# Patient Record
Sex: FEMALE | Race: WHITE | Employment: OTHER | URBAN - METROPOLITAN AREA
[De-identification: names, ages, dates, MRNs, and addresses within clinical notes are randomized per-mention and may not be internally consistent; named-entity substitution may affect disease eponyms.]

---

## 2018-03-02 NOTE — PATIENT DISCUSSION
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 - Plan : Monitor for changes. Advised patient to call our office with decreased vision or increased symptoms.

## 2018-03-02 NOTE — PATIENT DISCUSSION
(N69.470) Keratoconjunct sicca, not specified as Sjogren's, bilateral - Assesment : Examination revealed Dry Eye Syndrome OU. - Plan : Monitor for changes. Advised patient to call our office with decreased vision or increased symptoms Recommend the use of artificial tears 3-4 timed daily/PRN OU. Sample Thera tears  given.

## 2018-03-02 NOTE — PATIENT DISCUSSION
(H25.12) Age-related nuclear cataract, left eye - Assesment : Examination revealed cataract OS. Patient is currently asymptomatic and functioning well. - Plan : Monitor for changes. Advised patient to call our office with decreased vision or increased symptoms.   RTC 56 month follow up/ OCT mac

## 2018-03-02 NOTE — PATIENT DISCUSSION
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

## 2020-01-13 ENCOUNTER — PREPPED CHART (OUTPATIENT)
Dept: URBAN - METROPOLITAN AREA CLINIC 30 | Facility: CLINIC | Age: 85
End: 2020-01-13

## 2021-02-01 ASSESSMENT — KERATOMETRY
OD_AXISANGLE2_DEGREES: 77
OS_AXISANGLE2_DEGREES: 113
OS_K1POWER_DIOPTERS: 44.50
OS_K2POWER_DIOPTERS: 44.00
OS_AXISANGLE_DEGREES: 023
OD_K2POWER_DIOPTERS: 43.75
OD_K1POWER_DIOPTERS: 44.75
OD_AXISANGLE_DEGREES: 167

## 2021-02-04 ENCOUNTER — ESTABLISHED COMPREHENSIVE EXAM (OUTPATIENT)
Dept: URBAN - METROPOLITAN AREA CLINIC 30 | Facility: CLINIC | Age: 86
End: 2021-02-04

## 2021-02-04 DIAGNOSIS — H52.4: ICD-10-CM

## 2021-02-04 DIAGNOSIS — H43.813: ICD-10-CM

## 2021-02-04 DIAGNOSIS — H35.3131: ICD-10-CM

## 2021-02-04 PROCEDURE — 92250 FUNDUS PHOTOGRAPHY W/I&R: CPT

## 2021-02-04 PROCEDURE — 92015BEC REFRACTION BEC

## 2021-02-04 PROCEDURE — 92014 COMPRE OPH EXAM EST PT 1/>: CPT

## 2021-02-04 ASSESSMENT — KERATOMETRY
OD_AXISANGLE_DEGREES: 167
OS_AXISANGLE2_DEGREES: 86
OD_K1POWER_DIOPTERS: 45.00
OS_K2POWER_DIOPTERS: 44.00
OD_K1POWER_DIOPTERS: 44.75
OD_K2POWER_DIOPTERS: 43.75
OD_AXISANGLE2_DEGREES: 85
OD_AXISANGLE_DEGREES: 175
OS_K1POWER_DIOPTERS: 44.50
OS_K2POWER_DIOPTERS: 43.50
OS_AXISANGLE_DEGREES: 023
OS_AXISANGLE2_DEGREES: 113
OD_AXISANGLE2_DEGREES: 77
OS_K1POWER_DIOPTERS: 44.25
OD_K2POWER_DIOPTERS: 43.50
OS_AXISANGLE_DEGREES: 176

## 2021-02-04 ASSESSMENT — TONOMETRY
OS_IOP_MMHG: 13
OD_IOP_MMHG: 13

## 2021-09-08 ASSESSMENT — KERATOMETRY
OD_AXISANGLE2_DEGREES: 85
OD_K1POWER_DIOPTERS: 44.75
OD_AXISANGLE_DEGREES: 167
OS_AXISANGLE_DEGREES: 176
OS_K2POWER_DIOPTERS: 43.50
OS_AXISANGLE2_DEGREES: 113
OD_K1POWER_DIOPTERS: 45.00
OS_AXISANGLE2_DEGREES: 86
OS_K1POWER_DIOPTERS: 44.50
OD_AXISANGLE_DEGREES: 175
OS_K1POWER_DIOPTERS: 44.25
OD_K2POWER_DIOPTERS: 43.50
OD_K2POWER_DIOPTERS: 43.75
OS_AXISANGLE_DEGREES: 023
OS_K2POWER_DIOPTERS: 44.00
OD_AXISANGLE2_DEGREES: 77

## 2022-03-23 ENCOUNTER — PREPPED CHART (OUTPATIENT)
Dept: URBAN - METROPOLITAN AREA CLINIC 30 | Facility: CLINIC | Age: 87
End: 2022-03-23

## 2022-03-23 DIAGNOSIS — H35.3131: ICD-10-CM

## 2022-03-23 PROCEDURE — 0509T PATTERN ERG W/I&R: CPT

## 2022-03-23 PROCEDURE — 92134 CPTRZ OPH DX IMG PST SGM RTA: CPT

## 2022-03-23 PROCEDURE — 99214 OFFICE O/P EST MOD 30 MIN: CPT

## 2022-04-04 ASSESSMENT — KERATOMETRY
OD_K2POWER_DIOPTERS: 43.75
OD_K1POWER_DIOPTERS: 45.00
OS_K1POWER_DIOPTERS: 44.25
OD_AXISANGLE2_DEGREES: 77
OS_AXISANGLE_DEGREES: 023
OS_K2POWER_DIOPTERS: 43.50
OD_AXISANGLE_DEGREES: 167
OD_AXISANGLE_DEGREES: 175
OD_AXISANGLE2_DEGREES: 85
OD_K1POWER_DIOPTERS: 44.75
OS_AXISANGLE2_DEGREES: 86
OS_AXISANGLE_DEGREES: 176
OS_K1POWER_DIOPTERS: 44.50
OS_K2POWER_DIOPTERS: 44.00
OS_AXISANGLE2_DEGREES: 113
OD_K2POWER_DIOPTERS: 43.50

## 2022-04-12 ENCOUNTER — COMPREHENSIVE EXAM (OUTPATIENT)
Dept: URBAN - METROPOLITAN AREA CLINIC 30 | Facility: CLINIC | Age: 87
End: 2022-04-12

## 2022-04-12 DIAGNOSIS — H35.373: ICD-10-CM

## 2022-04-12 DIAGNOSIS — Z96.1: ICD-10-CM

## 2022-04-12 DIAGNOSIS — H43.813: ICD-10-CM

## 2022-04-12 DIAGNOSIS — H35.3131: ICD-10-CM

## 2022-04-12 PROCEDURE — 92134 CPTRZ OPH DX IMG PST SGM RTA: CPT

## 2022-04-12 PROCEDURE — 0509T PATTERN ERG W/I&R: CPT

## 2022-04-12 PROCEDURE — 92014 COMPRE OPH EXAM EST PT 1/>: CPT

## 2022-04-12 ASSESSMENT — VISUAL ACUITY
OD_SC: 20/60
OS_CC: 20/50
OD_PH: 20/30-1
OS_SC: 20/40-2
OS_PH: 20/30-2
OD_CC: 20/30+2
OU_SC: 20/50

## 2022-04-12 ASSESSMENT — TONOMETRY
OS_IOP_MMHG: 12
OD_IOP_MMHG: 15

## 2022-06-20 ENCOUNTER — APPOINTMENT (RX ONLY)
Dept: URBAN - METROPOLITAN AREA CLINIC 151 | Facility: CLINIC | Age: 87
Setting detail: DERMATOLOGY
End: 2022-06-20

## 2022-06-20 DIAGNOSIS — D18.0 HEMANGIOMA: ICD-10-CM

## 2022-06-20 DIAGNOSIS — L57.0 ACTINIC KERATOSIS: ICD-10-CM

## 2022-06-20 DIAGNOSIS — L81.4 OTHER MELANIN HYPERPIGMENTATION: ICD-10-CM

## 2022-06-20 DIAGNOSIS — L82.1 OTHER SEBORRHEIC KERATOSIS: ICD-10-CM

## 2022-06-20 DIAGNOSIS — Z71.89 OTHER SPECIFIED COUNSELING: ICD-10-CM

## 2022-06-20 PROBLEM — D18.01 HEMANGIOMA OF SKIN AND SUBCUTANEOUS TISSUE: Status: ACTIVE | Noted: 2022-06-20

## 2022-06-20 PROCEDURE — 17000 DESTRUCT PREMALG LESION: CPT

## 2022-06-20 PROCEDURE — ? LIQUID NITROGEN

## 2022-06-20 PROCEDURE — 99203 OFFICE O/P NEW LOW 30 MIN: CPT | Mod: 25

## 2022-06-20 PROCEDURE — ? FULL BODY SKIN EXAM

## 2022-06-20 PROCEDURE — ? COUNSELING

## 2022-06-20 ASSESSMENT — LOCATION SIMPLE DESCRIPTION DERM
LOCATION SIMPLE: LEFT CHEEK
LOCATION SIMPLE: LEFT LOWER BACK
LOCATION SIMPLE: CHEST
LOCATION SIMPLE: ABDOMEN
LOCATION SIMPLE: RIGHT FOREHEAD
LOCATION SIMPLE: RIGHT UPPER BACK
LOCATION SIMPLE: LEFT PRETIBIAL REGION
LOCATION SIMPLE: LEFT EAR

## 2022-06-20 ASSESSMENT — LOCATION ZONE DERM
LOCATION ZONE: LEG
LOCATION ZONE: TRUNK
LOCATION ZONE: EAR
LOCATION ZONE: FACE

## 2022-06-20 ASSESSMENT — LOCATION DETAILED DESCRIPTION DERM
LOCATION DETAILED: RIGHT SUPERIOR MEDIAL UPPER BACK
LOCATION DETAILED: LEFT DISTAL PRETIBIAL REGION
LOCATION DETAILED: LEFT CENTRAL MALAR CHEEK
LOCATION DETAILED: RIGHT MID-UPPER BACK
LOCATION DETAILED: LEFT RIB CAGE
LOCATION DETAILED: RIGHT SUPERIOR MEDIAL FOREHEAD
LOCATION DETAILED: LEFT SUPERIOR HELIX
LOCATION DETAILED: LEFT SUPERIOR LATERAL MIDBACK
LOCATION DETAILED: RIGHT MEDIAL SUPERIOR CHEST

## 2022-06-20 NOTE — PROCEDURE: LIQUID NITROGEN
Show Applicator Variable?: Yes
Consent: The patient's consent was obtained including but not limited to risks of crusting, scabbing, blistering, scarring, darker or lighter pigmentary change, recurrence, incomplete removal and infection.
Render Note In Bullet Format When Appropriate: No
Detail Level: Detailed
Number Of Freeze-Thaw Cycles: 2 freeze-thaw cycles
Duration Of Freeze Thaw-Cycle (Seconds): 5
Post-Care Instructions: I reviewed with the patient in detail post-care instructions. Patient is to wear sunprotection, and avoid picking at any of the treated lesions. Pt may apply Vaseline to crusted or scabbing areas.

## 2023-04-17 ASSESSMENT — KERATOMETRY
OS_AXISANGLE2_DEGREES: 113
OS_AXISANGLE_DEGREES: 023
OD_AXISANGLE_DEGREES: 167
OS_AXISANGLE2_DEGREES: 86
OD_K1POWER_DIOPTERS: 45.00
OD_K2POWER_DIOPTERS: 43.75
OS_AXISANGLE_DEGREES: 176
OD_AXISANGLE2_DEGREES: 85
OS_K1POWER_DIOPTERS: 44.25
OD_AXISANGLE_DEGREES: 175
OS_K1POWER_DIOPTERS: 44.50
OD_K1POWER_DIOPTERS: 44.75
OS_K2POWER_DIOPTERS: 44.00
OD_K2POWER_DIOPTERS: 43.50
OS_K2POWER_DIOPTERS: 43.50
OD_AXISANGLE2_DEGREES: 77

## 2023-04-18 ENCOUNTER — COMPREHENSIVE EXAM (OUTPATIENT)
Facility: LOCATION | Age: 88
End: 2023-04-18

## 2023-04-18 DIAGNOSIS — H35.09: ICD-10-CM

## 2023-04-18 DIAGNOSIS — H43.813: ICD-10-CM

## 2023-04-18 DIAGNOSIS — H35.373: ICD-10-CM

## 2023-04-18 DIAGNOSIS — H35.3131: ICD-10-CM

## 2023-04-18 DIAGNOSIS — Z96.1: ICD-10-CM

## 2023-04-18 PROCEDURE — 99214 OFFICE O/P EST MOD 30 MIN: CPT

## 2023-04-18 PROCEDURE — 92250 FUNDUS PHOTOGRAPHY W/I&R: CPT

## 2023-04-18 ASSESSMENT — VISUAL ACUITY
OD_PH: 20/20
OD_SC: 20/40
OS_SC: 20/40
OS_PH: 20/25

## 2023-04-18 ASSESSMENT — TONOMETRY
OD_IOP_MMHG: 14
OS_IOP_MMHG: 12

## 2023-06-20 ENCOUNTER — APPOINTMENT (RX ONLY)
Dept: URBAN - METROPOLITAN AREA CLINIC 151 | Facility: CLINIC | Age: 88
Setting detail: DERMATOLOGY
End: 2023-06-20

## 2023-06-20 DIAGNOSIS — L30.4 ERYTHEMA INTERTRIGO: ICD-10-CM | Status: STABLE

## 2023-06-20 DIAGNOSIS — D18.0 HEMANGIOMA: ICD-10-CM

## 2023-06-20 DIAGNOSIS — D22 MELANOCYTIC NEVI: ICD-10-CM

## 2023-06-20 DIAGNOSIS — L82.1 OTHER SEBORRHEIC KERATOSIS: ICD-10-CM

## 2023-06-20 DIAGNOSIS — L81.4 OTHER MELANIN HYPERPIGMENTATION: ICD-10-CM

## 2023-06-20 PROBLEM — D22.5 MELANOCYTIC NEVI OF TRUNK: Status: ACTIVE | Noted: 2023-06-20

## 2023-06-20 PROBLEM — D18.01 HEMANGIOMA OF SKIN AND SUBCUTANEOUS TISSUE: Status: ACTIVE | Noted: 2023-06-20

## 2023-06-20 PROCEDURE — ? TREATMENT REGIMEN

## 2023-06-20 PROCEDURE — ? ADDITIONAL NOTES

## 2023-06-20 PROCEDURE — ? COUNSELING

## 2023-06-20 PROCEDURE — 99213 OFFICE O/P EST LOW 20 MIN: CPT

## 2023-06-20 PROCEDURE — ? FULL BODY SKIN EXAM

## 2023-06-20 ASSESSMENT — LOCATION DETAILED DESCRIPTION DERM
LOCATION DETAILED: LEFT ANTERIOR DISTAL UPPER ARM
LOCATION DETAILED: RIGHT INFERIOR UPPER BACK
LOCATION DETAILED: PERIUMBILICAL SKIN
LOCATION DETAILED: RIGHT ANTERIOR PROXIMAL THIGH
LOCATION DETAILED: LEFT MEDIAL UPPER BACK
LOCATION DETAILED: RIGHT MEDIAL SUPERIOR CHEST
LOCATION DETAILED: LEFT ANKLE
LOCATION DETAILED: RIGHT ANKLE
LOCATION DETAILED: RIGHT SUPERIOR UPPER BACK
LOCATION DETAILED: LEFT ANTERIOR PROXIMAL THIGH
LOCATION DETAILED: LEFT ANTIHELIX

## 2023-06-20 ASSESSMENT — LOCATION SIMPLE DESCRIPTION DERM
LOCATION SIMPLE: CHEST
LOCATION SIMPLE: RIGHT ANKLE
LOCATION SIMPLE: LEFT THIGH
LOCATION SIMPLE: ABDOMEN
LOCATION SIMPLE: LEFT ANKLE
LOCATION SIMPLE: RIGHT UPPER BACK
LOCATION SIMPLE: LEFT UPPER ARM
LOCATION SIMPLE: LEFT EAR
LOCATION SIMPLE: LEFT UPPER BACK
LOCATION SIMPLE: RIGHT THIGH

## 2023-06-20 ASSESSMENT — LOCATION ZONE DERM
LOCATION ZONE: ARM
LOCATION ZONE: LEG
LOCATION ZONE: EAR
LOCATION ZONE: TRUNK

## 2024-08-07 ASSESSMENT — KERATOMETRY
OD_AXISANGLE2_DEGREES: 85
OS_K1POWER_DIOPTERS: 44.25
OS_AXISANGLE_DEGREES: 176
OD_K1POWER_DIOPTERS: 44.75
OD_AXISANGLE2_DEGREES: 77
OS_K2POWER_DIOPTERS: 44.00
OS_K2POWER_DIOPTERS: 43.50
OD_K1POWER_DIOPTERS: 45.00
OD_K2POWER_DIOPTERS: 43.50
OS_K1POWER_DIOPTERS: 44.50
OS_AXISANGLE_DEGREES: 023
OD_K2POWER_DIOPTERS: 43.75
OS_AXISANGLE2_DEGREES: 86
OD_AXISANGLE_DEGREES: 175
OD_AXISANGLE_DEGREES: 167
OS_AXISANGLE2_DEGREES: 113

## 2024-08-08 ENCOUNTER — COMPREHENSIVE EXAM (OUTPATIENT)
Facility: LOCATION | Age: 89
End: 2024-08-08

## 2024-08-08 DIAGNOSIS — H35.373: ICD-10-CM

## 2024-08-08 DIAGNOSIS — H35.09: ICD-10-CM

## 2024-08-08 DIAGNOSIS — H35.3131: ICD-10-CM

## 2024-08-08 DIAGNOSIS — H43.813: ICD-10-CM

## 2024-08-08 PROCEDURE — 92134 CPTRZ OPH DX IMG PST SGM RTA: CPT

## 2024-08-08 PROCEDURE — 92014 COMPRE OPH EXAM EST PT 1/>: CPT

## 2024-08-08 ASSESSMENT — VISUAL ACUITY
OS_PH: 20/50
OS_SC: 20/60
OS_CC: 20/40+1
OD_CC: J2
OD_CC: 20/20
OS_CC: J2
OD_SC: 20/50-1
OD_PH: 20/20

## 2024-08-08 ASSESSMENT — TONOMETRY
OD_IOP_MMHG: 11
OS_IOP_MMHG: 11

## 2024-08-15 ENCOUNTER — APPOINTMENT (RX ONLY)
Dept: URBAN - METROPOLITAN AREA CLINIC 151 | Facility: CLINIC | Age: 89
Setting detail: DERMATOLOGY
End: 2024-08-15

## 2024-08-15 DIAGNOSIS — L81.4 OTHER MELANIN HYPERPIGMENTATION: ICD-10-CM

## 2024-08-15 DIAGNOSIS — L82.1 OTHER SEBORRHEIC KERATOSIS: ICD-10-CM

## 2024-08-15 DIAGNOSIS — D18.0 HEMANGIOMA: ICD-10-CM

## 2024-08-15 PROBLEM — D18.01 HEMANGIOMA OF SKIN AND SUBCUTANEOUS TISSUE: Status: ACTIVE | Noted: 2024-08-15

## 2024-08-15 PROBLEM — D23.62 OTHER BENIGN NEOPLASM OF SKIN OF LEFT UPPER LIMB, INCLUDING SHOULDER: Status: ACTIVE | Noted: 2024-08-15

## 2024-08-15 PROCEDURE — 99213 OFFICE O/P EST LOW 20 MIN: CPT

## 2024-08-15 PROCEDURE — ? TREATMENT REGIMEN

## 2024-08-15 PROCEDURE — ? COUNSELING

## 2024-08-15 ASSESSMENT — LOCATION ZONE DERM
LOCATION ZONE: TRUNK
LOCATION ZONE: EAR
LOCATION ZONE: FACE

## 2024-08-15 ASSESSMENT — LOCATION SIMPLE DESCRIPTION DERM
LOCATION SIMPLE: LEFT EAR
LOCATION SIMPLE: LEFT CHEEK
LOCATION SIMPLE: RIGHT UPPER BACK
LOCATION SIMPLE: ABDOMEN
LOCATION SIMPLE: CHEST

## 2024-08-15 ASSESSMENT — LOCATION DETAILED DESCRIPTION DERM
LOCATION DETAILED: RIGHT SUPERIOR MEDIAL UPPER BACK
LOCATION DETAILED: LEFT INFERIOR CENTRAL MALAR CHEEK
LOCATION DETAILED: LEFT SUPERIOR HELIX
LOCATION DETAILED: PERIUMBILICAL SKIN
LOCATION DETAILED: LEFT MEDIAL SUPERIOR CHEST

## 2025-03-25 ENCOUNTER — COMPREHENSIVE EXAM (OUTPATIENT)
Age: OVER 89
End: 2025-03-25

## 2025-03-25 DIAGNOSIS — H35.09: ICD-10-CM

## 2025-03-25 DIAGNOSIS — H43.393: ICD-10-CM

## 2025-03-25 DIAGNOSIS — H02.886: ICD-10-CM

## 2025-03-25 DIAGNOSIS — H35.3131: ICD-10-CM

## 2025-03-25 DIAGNOSIS — H35.373: ICD-10-CM

## 2025-03-25 DIAGNOSIS — H02.883: ICD-10-CM

## 2025-03-25 DIAGNOSIS — H43.813: ICD-10-CM

## 2025-03-25 PROCEDURE — 92250 FUNDUS PHOTOGRAPHY W/I&R: CPT

## 2025-03-25 PROCEDURE — 92014 COMPRE OPH EXAM EST PT 1/>: CPT

## 2025-04-02 ENCOUNTER — APPOINTMENT (OUTPATIENT)
Dept: URBAN - METROPOLITAN AREA CLINIC 151 | Facility: CLINIC | Age: OVER 89
Setting detail: DERMATOLOGY
End: 2025-04-02

## 2025-04-02 PROBLEM — C44.321 SQUAMOUS CELL CARCINOMA OF SKIN OF NOSE: Status: ACTIVE | Noted: 2025-04-02

## 2025-04-02 PROCEDURE — ? COUNSELING

## 2025-04-02 PROCEDURE — ? OTHER

## 2025-04-02 PROCEDURE — ? BIOPSY BY SHAVE METHOD AND DESTRUCTION

## 2025-04-02 PROCEDURE — 17281 DSTR MAL LS F/E/E/N/L/M .6-1: CPT

## 2025-04-02 NOTE — PROCEDURE: OTHER
Render Risk Assessment In Note?: no
Note Text (......Xxx Chief Complaint.): This diagnosis correlates with the
Detail Level: Zone
Other (Free Text): Discussed various treatment options; patient and daughter opt for shave and destruction today, but if lesion recurs despite that, can set up Mohs with Dr. Mares.